# Patient Record
Sex: MALE | Race: WHITE
[De-identification: names, ages, dates, MRNs, and addresses within clinical notes are randomized per-mention and may not be internally consistent; named-entity substitution may affect disease eponyms.]

---

## 2018-09-06 ENCOUNTER — HOSPITAL ENCOUNTER (EMERGENCY)
Dept: HOSPITAL 25 - ED | Age: 28
Discharge: HOME | End: 2018-09-06
Payer: COMMERCIAL
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
## 2018-09-06 DIAGNOSIS — S61.212A: Primary | ICD-10-CM

## 2018-09-06 DIAGNOSIS — Z23: ICD-10-CM
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
## 2018-09-06 DIAGNOSIS — F17.210: ICD-10-CM
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
## 2018-09-06 PROCEDURE — 90715 TDAP VACCINE 7 YRS/> IM: CPT

## 2018-09-06 PROCEDURE — 90471 IMMUNIZATION ADMIN: CPT

## 2018-09-06 PROCEDURE — 99282 EMERGENCY DEPT VISIT SF MDM: CPT

## 2018-09-06 NOTE — ED
Complex/Multi-Sys Presentation





- HPI Summary


HPI Summary: 


This is Jaspal pineda, documenting for attending Nemo Lo MD. 





Pt is a 28 y/o M presents to ED with laceration located on his R middle finger 

on joint which opened up 0457, this AM. Pain was rated a 2/10, per quick 

triage. Assoc. Sx: Lac R middle finger. Denies: fever. Per RN assessment, Pt 

was cleaning metal tray at work and cut his right middle finger on joint, 

Bleeding controlled and pt states he is UTD on his tetanus. 





- History Of Current Complaint


Chief Complaint: EDLacSutureRecheck


Time Seen by Provider: 09/06/18 05:00


Hx Obtained From: Patient


Onset/Duration: Sudden Onset


Timing: Constant


Severity Currently: Mild


Associated Signs And Symptoms: Negative: Fever





- Allergies/Home Medications


Allergies/Adverse Reactions: 


 Allergies











Allergy/AdvReac Type Severity Reaction Status Date / Time


 


No Known Allergies Allergy   Verified 05/09/17 15:09











Home Medications: 


 Home Medications





Escitalopram Oxalate [Lexapro 10 mg] 10 mg PO DAILY 09/06/18 [History Confirmed 

09/06/18]











PMH/Surg Hx/FS Hx/Imm Hx


Endocrine/Hematology History: 


   Denies: Hx Anticoagulant Therapy, Hx Diabetes, Hx Thyroid Disease


Cardiovascular History: 


   Denies: Hx Congestive Heart Failure, Hx Deep Vein Thrombosis, Hx Hypertension

, Hx Myocardial Infarction, Hx Pacemaker/ICD


Respiratory History: 


   Denies: Hx Asthma, Hx Chronic Obstructive Pulmonary Disease (COPD), Hx Lung 

Cancer, Hx Pneumonia, Hx Pulmonary Embolism


GI History: 


   Denies: Hx Gall Bladder Disease, Hx Gastrointestinal Bleed, Hx Ulcer, Hx 

Urosepsis


 History: 


   Denies: Hx Kidney Stones, Hx Renal Disease


Neurological History: 


   Denies: Hx Dementia, Hx Migraine, Hx Seizures, Hx Transient Ischemic Attacks 

(TIA)


Psychiatric History: Reports: Hx Anxiety, Hx Depression - No medications at 

this time.


   Denies: Hx Schizophrenia, Hx Bipolar Disorder


Infectious Disease History: No


Infectious Disease History: 


   Denies: Traveled Outside the [place] in Last 30 Days





- Family History


Known Family History: Positive: Hypertension, Diabetes


   Negative: Cardiac Disease





- Social History


Occupation: Student


Lives: With Family


Alcohol Use: Daily


Alcohol Amount: 3x week


Substance Use Type: Reports: None


Smoking Status (MU): Light Every Day Tobacco Smoker


Type: Cigarettes


Amount Used/How Often: rare social smoking





Review of Systems


Negative: Fever


Positive: Other - POS: Lac R middle finger


All Other Systems Reviewed And Are Negative: Yes





Physical Exam





- Summary


Physical Exam Summary: 


VITAL SIGNS: Reviewed.


GENERAL:  Patient is a well-developed and nourished male who is lying 

comfortable in the stretcher. Patient is not in any acute respiratory distress.


HEAD AND FACE: No signs of trauma. No ecchymosis, hematomas or skull 

depressions. No sinus tenderness.


EYES: PERRLA, EOMI x 2, No injected conjunctiva, no nystagmus.


EARS: Hearing grossly intact. Ear canals and tympanic membranes are within 

normal limits.


MOUTH: Oropharynx within normal limits.


NECK: Supple, trachea is midline, no adenopathy, no JVD, no carotid bruit, no c-

spine tenderness, neck with full ROM.


CHEST: Symmetric, no tenderness at palpation


LUNGS: Clear to auscultation bilaterally. No wheezing or crackles.


CVS: Regular rate and rhythm, S1 and S2 present, no murmurs or gallops 

appreciated.


ABDOMEN: Soft, non-tender. No signs of distention. No rebound no guarding, and 

no masses palpated. Bowel sounds are normal.


EXTREMITIES: FROM in all major joints, no edema, no cyanosis or clubbing.


NEURO: Alert and oriented x 3. No acute neurological deficits. Speech is normal 

and follows commands.


SKIN: Dry and warm. 1 cm laceration - R middle finger on joint, palmar side.


Triage Information Reviewed: Yes


Vital Signs On Initial Exam: 


 Initial Vitals











Temp Pulse Resp BP Pulse Ox


 


 98 F   81   16   135/85   98 


 


 09/06/18 04:56  09/06/18 04:56  09/06/18 04:56  09/06/18 04:56  09/06/18 04:56











Vital Signs Reviewed: Yes





Procedures





- Laceration/Wound Repair


  ** 1


Location: lower extremity - R middle finger on joint


Description: Linear


Anesthesia: 2.0%, Lido


Irrigated w/ Saline (ccs): 50


Laceration/Wound Explored: clean


Closure: Single Layer


Suture Type: Nylon


Number of Sutures: 5


Layer Closure?: No


Sterile Dressing Applied?: Yes





Diagnostics





- Vital Signs


 Vital Signs











  Temp Pulse Resp BP Pulse Ox


 


 09/06/18 04:56  98 F  81  16  135/85  98














- Laboratory


Lab Statement: Any lab studies that have been ordered have been reviewed, and 

results considered in the medical decision making process.





Complex Multi-Symp Course/Dx


Course Of Treatment: Patient presents to ED for lac suture. Provider sutured up 

patients laceration and he will e D/C home





- Diagnoses


Provider Diagnoses: 


 Finger laceration








Discharge





- Sign-Out/Discharge


Documenting (check all that apply): Patient Departure





- Discharge Plan


Condition: Stable


Disposition: HOME


Patient Education Materials:  Care For Your Stitches (ED)


Referrals: 


Canelo Cruz NP [Primary Care Provider] - 09/15/18


Additional Instructions: 


- RETURN TO THE EMERGENCY DEPARTMENT FOR CHANGING OR WORSENING SYMPTOMS. FOLLOW 

UP WITH PCP IN 1-2 DAYS.


- Follow up with physician in 10 days for suture removal.





- Attestation Statements


Document Initiated by Scribe: Yes


Documenting Scribe: Jaspal Knapp


Provider For Whom Scribe is Documenting (Include Credential): Nemo Lo MD.


Scribe Attestation: 


Jaspal [place], scribed for Nemo Lo MD. on 09/06/18 at 0532.

## 2018-09-21 ENCOUNTER — HOSPITAL ENCOUNTER (EMERGENCY)
Dept: HOSPITAL 25 - ED | Age: 28
LOS: 1 days | Discharge: HOME | End: 2018-09-22
Payer: COMMERCIAL

## 2018-09-21 DIAGNOSIS — S61.012A: Primary | ICD-10-CM

## 2018-09-21 DIAGNOSIS — F17.210: ICD-10-CM
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
## 2018-09-21 PROCEDURE — 12001 RPR S/N/AX/GEN/TRNK 2.5CM/<: CPT

## 2018-09-21 PROCEDURE — 99282 EMERGENCY DEPT VISIT SF MDM: CPT

## 2018-09-22 VITALS — DIASTOLIC BLOOD PRESSURE: 73 MMHG | SYSTOLIC BLOOD PRESSURE: 123 MMHG

## 2018-09-22 NOTE — ED
Laceration/Wound HPI





- HPI Summary


HPI Summary: 


27 year male presents with left thumb laceration today.  He states he cut it on 

some glass.  He was working as a  and twisted glass and broke and cut 

his left hand.  He denies any foreign body in the wound.  Tetanus is up to 

date.  He has full range motion his finger.  No numbness or tingling.  Area 

continues to bleed.  No medical conditions.








- History of Current Complaint


Stated Complaint: LT THUMB LAC


Time Seen by Provider: 09/22/18 00:48


Pain Intensity: 3





- Allergy/Home Medications


Allergies/Adverse Reactions: 


 Allergies











Allergy/AdvReac Type Severity Reaction Status Date / Time


 


No Known Allergies Allergy   Verified 05/09/17 15:09














PMH/Surg Hx/FS Hx/Imm Hx


Endocrine/Hematology History: 


   Denies: Hx Anticoagulant Therapy, Hx Diabetes, Hx Thyroid Disease


Cardiovascular History: 


   Denies: Hx Congestive Heart Failure, Hx Deep Vein Thrombosis, Hx Hypertension

, Hx Myocardial Infarction, Hx Pacemaker/ICD


Respiratory History: 


   Denies: Hx Asthma, Hx Chronic Obstructive Pulmonary Disease (COPD), Hx Lung 

Cancer, Hx Pneumonia, Hx Pulmonary Embolism


GI History: 


   Denies: Hx Gall Bladder Disease, Hx Gastrointestinal Bleed, Hx Ulcer, Hx 

Urosepsis


 History: 


   Denies: Hx Kidney Stones, Hx Renal Disease


Neurological History: 


   Denies: Hx Dementia, Hx Migraine, Hx Seizures, Hx Transient Ischemic Attacks 

(TIA)


Psychiatric History: Reports: Hx Anxiety, Hx Depression - No medications at 

this time.


   Denies: Hx Schizophrenia, Hx Bipolar Disorder





- Immunization History


Date of Tetanus Vaccine: 9/6/2018


Immunizations Up to Date: Yes


Infectious Disease History: No


Infectious Disease History: 


   Denies: Traveled Outside the [place] in Last 30 Days





- Family History


Known Family History: Positive: Hypertension, Diabetes


   Negative: Cardiac Disease





- Social History


Alcohol Use: Daily


Alcohol Amount: 2 drinks a day


Substance Use Type: Reports: None


Smoking Status (MU): Light Every Day Tobacco Smoker


Type: Cigarettes


Amount Used/How Often: rare social smoking





Review of Systems


Negative: Fever


Negative: Chest Pain


Negative: Shortness Of Breath


Positive: Other - left thumb laceration


All Other Systems Reviewed And Are Negative: Yes





Physical Exam


Triage Information Reviewed: Yes


Vital Signs On Initial Exam: 


 Initial Vitals











Temp Pulse Resp BP Pulse Ox


 


 98.8 F   77   15   132/66   95 


 


 09/21/18 22:45  09/21/18 22:45  09/21/18 22:45  09/21/18 22:45  09/21/18 22:45











Vital Signs Reviewed: Yes


Appearance: Positive: Well-Appearing


Skin: Positive: Warm, Dry, Other - 1 and 1/2cm superficial laceration to left 

palmar aspect


Head/Face: Positive: Normal Head/Face Inspection


Eyes: Positive: Normal, Conjunctiva Clear


ENT: Positive: Pharynx normal


Respiratory/Lung Sounds: Positive: Clear to Auscultation, Breath Sounds Present


Cardiovascular: Positive: Normal, RRR


Musculoskeletal: Positive: Strength/ROM Intact - left thumb


Neurological: Positive: Normal


Psychiatric: Positive: Normal





Procedures





- Laceration/Wound Repair


  ** 1


Location: Other - left thumb 


Description: Linear


Anesthesia: Digital, 1.0%


Length, Depth and Shape: 1 and 1/2cm superficial


Irrigated w/ Saline (ccs): 300


Closure: Single Layer


Suture Type: Prolene


Number of Sutures: 3


Layer Closure?: No


Sterile Dressing Applied?: Yes - telfa, gauze, coband





Diagnostics





- Vital Signs


 Vital Signs











  Temp Pulse Resp BP Pulse Ox


 


 09/21/18 22:45  98.8 F  77  15  132/66  95














- Laboratory


Lab Statement: Any lab studies that have been ordered have been reviewed, and 

results considered in the medical decision making process.





Laceration Repair Course/Dx





- Course


Course Of Treatment: 27 year male presents with left thumb laceration today.  

He states he cut it on some glass.  He was working as a  and twisted 

glass and broke and cut his left hand.  He denies any foreign body in the 

wound.  Tetanus is up to date.  He has full range motion his finger.  No 

numbness or tingling.  Area continues to bleed.  No medical conditions.  on 

exam has 1 and 1/2cm superficial laceration to left thumb. cleaned area and 

placed 3 sutures. placed pressure dressing on the area as continues to bleed 

after closed. observed for 20 minutes after  wrapping around and no rebleed. 

patient understand and agrees with plan.





- Differential Dx


Differental Diagnoses: Abrasion, Avulsion, Laceration





- Clinical Impression


Provider Diagnoses: 


 Thumb laceration








Discharge





- Sign-Out/Discharge


Documenting (check all that apply): Patient Departure





- Discharge Plan


Condition: Good


Disposition: HOME


Patient Education Materials:  Care For Your Stitches (ED)


Referrals: 


Canelo Cruz NP [Primary Care Provider] - 


Additional Instructions: 


change dressing once a day


Keep area clean and dry for 48 hours


Take Tylenol or ibuprofen for pain every 6 hours


Return to ED or primary for suture removal in 8-10 days


Return to ED if develop signs of infection such as fever, spreading redness, or 

pus formation





- Billing Disposition and Condition


Condition: GOOD


Disposition: Home